# Patient Record
(demographics unavailable — no encounter records)

---

## 2024-12-13 NOTE — HISTORY OF PRESENT ILLNESS
[FreeTextEntry1] : here for annual known endometriosis mirena in place having pain possibly related to recurrent ov cysts vaginal cysts on and off pain radiating to vagina and anus as well virginal